# Patient Record
Sex: MALE | Race: WHITE | Employment: FULL TIME | URBAN - METROPOLITAN AREA
[De-identification: names, ages, dates, MRNs, and addresses within clinical notes are randomized per-mention and may not be internally consistent; named-entity substitution may affect disease eponyms.]

---

## 2023-02-16 ENCOUNTER — OFFICE VISIT (OUTPATIENT)
Dept: ENT CLINIC | Age: 38
End: 2023-02-16
Payer: COMMERCIAL

## 2023-02-16 VITALS — HEIGHT: 75 IN | WEIGHT: 215 LBS | BODY MASS INDEX: 26.73 KG/M2 | RESPIRATION RATE: 16 BRPM | TEMPERATURE: 97.6 F

## 2023-02-16 DIAGNOSIS — B97.89 SORE THROAT (VIRAL): ICD-10-CM

## 2023-02-16 DIAGNOSIS — R05.1 ACUTE COUGH: Primary | ICD-10-CM

## 2023-02-16 DIAGNOSIS — J02.8 SORE THROAT (VIRAL): ICD-10-CM

## 2023-02-16 PROCEDURE — 99203 OFFICE O/P NEW LOW 30 MIN: CPT | Performed by: OTOLARYNGOLOGY

## 2023-02-16 RX ORDER — CETIRIZINE HYDROCHLORIDE 10 MG/1
10 TABLET ORAL DAILY
COMMUNITY

## 2023-02-16 RX ORDER — BENZONATATE 100 MG/1
100-200 CAPSULE ORAL 3 TIMES DAILY PRN
Qty: 60 CAPSULE | Refills: 0 | Status: SHIPPED | OUTPATIENT
Start: 2023-02-16 | End: 2023-02-23

## 2023-02-16 RX ORDER — PREDNISONE 20 MG/1
20 TABLET ORAL 2 TIMES DAILY
Qty: 10 TABLET | Refills: 0 | Status: SHIPPED | OUTPATIENT
Start: 2023-02-16 | End: 2023-02-21

## 2023-02-16 ASSESSMENT — ENCOUNTER SYMPTOMS
CHOKING: 0
CHEST TIGHTNESS: 0
SHORTNESS OF BREATH: 0
TROUBLE SWALLOWING: 0
SORE THROAT: 1
COUGH: 1
WHEEZING: 0
SINUS PRESSURE: 0
SINUS PAIN: 0
VOICE CHANGE: 0
ABDOMINAL PAIN: 0

## 2023-02-16 NOTE — PATIENT INSTRUCTIONS
-Drink 1.5-2L of water daily  -Can start tessalon pearls for cough  -Start steroids for general inflammation/sore throat  -Increase flonase to twice daily  (with chin to chest positioning)    Call if no improvement in 1-2 weeks

## 2023-02-16 NOTE — PROGRESS NOTES
[address] [address], [address], [address] [place] [address]  P: [phone]       Patient     Jefm Cushing  1985    Chief Concern     Chief Complaint   Patient presents with    New Patient     States that he has been coughing a lot and has had a sore throat for the last few days, states that his daughter currently has strep. 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 States that he had sinus surgery 2 years ago. Assessment and Plan      Diagnosis Orders   1. Acute cough  benzonatate (TESSALON) 100 MG capsule      2. Sore throat (viral)  predniSONE (DELTASONE) 20 MG tablet        Patient with concern for acute cough, sore throat-likely viral (no pharyngitis palpated, no lymphadenopathy noted), will treat him with Tessalon for his cough, and prednisone for inflammation-he is currently on Flonase, we have asked him to increase his to twice daily dosing he is to call back if no improvement    No follow-ups on file. History of Present Illness     34 y.o. male with history of CRS, prior FESS per Dr. Iveth Davidson 2 years ago - following surgery he felt improved nasal breathing, continues to have sinus pressure and congestion, bilateral ear fullness - believes this is seasonal (worse in Winter/Spring transition, in the fall and with low pressure weather). Occasional clear rhinorrhea, no purulent drainage. Slight hypogeusia/hyposmia. No history of allergy testing or shots, not currently on any sinus rinses but uses flonase and cetirizine daily. Concern for post-nasal drip ongoing for several weeks, since 01/2023 - daughter has strep pharyngitis this week. Having sore throat that began this week - no fevers, chills, night sweat. No chest pain/dyspnea. Concern for vertigo 01/2023 - had severe nausea, intermittent vertigo, bad headache that lasted 10 minutes at a time, ongoing for 1.5 weeks.  Was treated with meclizine per PCP (Dr. Kavita Tejada at [facility]) - no history of prior vertigo, COVID/flu testing were negative, no hearing loss, no tinnitus. Coffee 1 cup/day, nonsmoker    Past Medical History     No past medical history on file. Past Surgical History     Past Surgical History:   Procedure Laterality Date    SINUS SURGERY         Family History     No family history on file. Social History     Social History     Tobacco Use    Smoking status: Never    Smokeless tobacco: Never   Vaping Use    Vaping Use: Never used   Substance Use Topics    Alcohol use: Not Currently     Comment: None in the last month    Drug use: Never        Allergies     Allergies   Allergen Reactions    Pcn [Penicillins] Other (See Comments)     Unknown was told as a child he was allergic       Medications     Current Outpatient Medications   Medication Sig Dispense Refill    Fluticasone Propionate (FLONASE ALLERGY RELIEF NA)       cetirizine (ZYRTEC) 10 MG tablet Take 10 mg by mouth daily      Ibuprofen (MOTRIN IB PO) Take by mouth      benzonatate (TESSALON) 100 MG capsule Take 1-2 capsules by mouth 3 times daily as needed for Cough 60 capsule 0    predniSONE (DELTASONE) 20 MG tablet Take 1 tablet by mouth 2 times daily for 5 days 10 tablet 0     No current facility-administered medications for this visit. Review of Systems     Review of Systems   Constitutional:  Negative for activity change, chills, diaphoresis, fatigue and fever. HENT:  Positive for sore throat. Negative for congestion, hearing loss, sinus pressure, sinus pain, trouble swallowing and voice change. Eyes:  Negative for visual disturbance. Respiratory:  Positive for cough. Negative for choking, chest tightness, shortness of breath and wheezing. Cardiovascular:  Negative for chest pain. Gastrointestinal:  Negative for abdominal pain. Musculoskeletal:  Negative for neck pain and neck stiffness. Skin:  Negative for rash. Neurological:  Negative for dizziness, light-headedness and headaches. Hematological:  Negative for adenopathy. PhysicalExam     Vitals:    02/16/23 0919   Resp: 16   Temp: 97.6 °F (36.4 °C)   TempSrc: Infrared   Weight: 215 lb (97.5 kg)   Height: 6' 3\" (1.905 m)       Physical Exam  Vitals reviewed. Constitutional:       Appearance: Normal appearance. HENT:      Head: Normocephalic and atraumatic. Right Ear: Tympanic membrane, ear canal and external ear normal. There is no impacted cerumen. Left Ear: Tympanic membrane, ear canal and external ear normal. There is no impacted cerumen. Nose: No congestion or rhinorrhea. Mouth/Throat:      Lips: Pink. No lesions. Mouth: Mucous membranes are moist. No oral lesions. Tongue: No lesions. Tongue does not deviate from midline. Palate: No mass and lesions. Pharynx: Oropharynx is clear. Uvula midline. No oropharyngeal exudate or posterior oropharyngeal erythema. Eyes:      Extraocular Movements: Extraocular movements intact. Pupils: Pupils are equal, round, and reactive to light. Musculoskeletal:      Cervical back: Normal range of motion and neck supple. Lymphadenopathy:      Cervical: No cervical adenopathy. Neurological:      Mental Status: He is alert. Cranial Nerves: No cranial nerve deficit. Data Review        Procedure     None    Meir Young MD  2/16/23    Portions of this note were dictated using Dragon.  There may be linguistic errors secondary to the use of this program.

## 2023-02-23 ENCOUNTER — OFFICE VISIT (OUTPATIENT)
Dept: ENT CLINIC | Age: 38
End: 2023-02-23
Payer: COMMERCIAL

## 2023-02-23 VITALS — HEIGHT: 75 IN | WEIGHT: 215 LBS | BODY MASS INDEX: 26.73 KG/M2 | TEMPERATURE: 96.8 F | RESPIRATION RATE: 16 BRPM

## 2023-02-23 DIAGNOSIS — R05.2 SUBACUTE COUGH: ICD-10-CM

## 2023-02-23 DIAGNOSIS — R09.82 POSTNASAL DRIP: Primary | ICD-10-CM

## 2023-02-23 PROCEDURE — 31575 DIAGNOSTIC LARYNGOSCOPY: CPT | Performed by: OTOLARYNGOLOGY

## 2023-02-23 PROCEDURE — 99213 OFFICE O/P EST LOW 20 MIN: CPT | Performed by: OTOLARYNGOLOGY

## 2023-02-23 RX ORDER — SODIUM CHLORIDE/SODIUM BICARB
1 PACKET (EA) NASAL 2 TIMES DAILY
Qty: 200 EACH | Refills: 1 | Status: SHIPPED | OUTPATIENT
Start: 2023-02-23

## 2023-02-23 RX ORDER — PREDNISONE 20 MG/1
40 TABLET ORAL DAILY
Qty: 20 TABLET | Refills: 0 | Status: SHIPPED | OUTPATIENT
Start: 2023-02-23 | End: 2023-03-05

## 2023-02-23 ASSESSMENT — ENCOUNTER SYMPTOMS
COUGH: 1
TROUBLE SWALLOWING: 0
SINUS PRESSURE: 0
CHOKING: 0
VOICE CHANGE: 0
CHEST TIGHTNESS: 0
WHEEZING: 0
SINUS PAIN: 0
SHORTNESS OF BREATH: 0
SORE THROAT: 1
ABDOMINAL PAIN: 0

## 2023-02-23 NOTE — PATIENT INSTRUCTIONS
Instructions for Sinus Rinses/Nasal Sprays  -Start hypertonic nasal saline rinses saline twice daily - use distilled water, or water that you have boiled (and that has cooled to room temperature). Use a salt packet in the sinus rinse bottle.  When performing rinses, please stand over your sink with your chin tucked into your chest. Use 4oz of the bottle in the left nasal passage, and 4oz in the right nasal passage  -Start topical nasal steroid sprays twice daily 15-20 minutes after sinus rinses - again, tuck chin to chest when using sprays and place the spray tip of the bottle in the nose, with the tip oriented toward the ceiling   -Start prednisone 40mg daily for 10 days

## 2023-02-23 NOTE — PROGRESS NOTES
Worse sinus drainage and postnasal drip over the past 7 days, along with productive cough that has slight yellow tinge, but typically white. No improvement with tessalon pearls. Had a fever yesterday to 99.9 yesterday after taking Tylenol. Has chills, no chest pain, but forceful cough which has worsened. Right ear with otalgia/fullness.

## 2023-02-23 NOTE — PROGRESS NOTES
[facility] [address], [address], [address] [place] [address]  P: [phone]       Patient     Bianka Danielle  1985    Chief Concern     Chief Complaint   Patient presents with    Follow-up     States that he is not doing any better       Assessment and Plan      Diagnosis Orders   1. Postnasal drip  Hypertonic Nasal Wash (SINUS RINSE REFILL) PACK      2. Subacute cough  predniSONE (DELTASONE) 20 MG tablet        Patient with concern for acute cough, sore throat-likely viral (no pharyngitis palpated, no lymphadenopathy noted), but may have a significant post-nasal drip. Will start him on sinus rinses, continue flonase, attempt a greater course of prednisone and consider CXR/chest CT if no improvement. Return in about 2 weeks (around 3/9/2023). History of Present Illness     34 y.o. male with history of CRS, prior FESS per Dr. Angela Zepeda 2 years ago - following surgery he felt improved nasal breathing, continues to have sinus pressure and congestion, bilateral ear fullness - believes this is seasonal (worse in Winter/Spring transition, in the fall and with low pressure weather). Occasional clear rhinorrhea, no purulent drainage. Slight hypogeusia/hyposmia. No history of allergy testing or shots, not currently on any sinus rinses but uses flonase and cetirizine daily. Concern for post-nasal drip ongoing for several weeks, since 01/2023 - daughter has strep pharyngitis this week. Having sore throat that began this week - no fevers, chills, night sweat. No chest pain/dyspnea. Concern for vertigo 01/2023 - had severe nausea, intermittent vertigo, bad headache that lasted 10 minutes at a time, ongoing for 1.5 weeks. Was treated with meclizine per PCP (Dr. Stephanie Pablo at [facility]) - no history of prior vertigo, COVID/flu testing were negative, no hearing loss, no tinnitus.      Coffee 1 cup/day, nonsmoker    Interval History 2/23/2023: Last visit 2/16/2023, no improvement with 20mg prednisone BID x 5 days, tessalon pearls - worse sinus drainage and postnasal drip over the past 7 days, along with productive cough that has slight yellow tinge, but typically white. Had a fever yesterday to 99.9 yesterday after taking Tylenol. Has chills, no chest pain, but forceful cough which has worsened. Right ear with otalgia/fullness.     Past Medical History     No past medical history on file.    Past Surgical History     Past Surgical History:   Procedure Laterality Date    SINUS SURGERY         Family History     No family history on file.    Social History     Social History     Tobacco Use    Smoking status: Never    Smokeless tobacco: Never   Vaping Use    Vaping Use: Never used   Substance Use Topics    Alcohol use: Not Currently     Comment: None in the last month    Drug use: Never        Allergies     Allergies   Allergen Reactions    Pcn [Penicillins] Other (See Comments)     Unknown was told as a child he was allergic       Medications     Current Outpatient Medications   Medication Sig Dispense Refill    predniSONE (DELTASONE) 20 MG tablet Take 2 tablets by mouth daily for 10 days 20 tablet 0    Hypertonic Nasal Wash (SINUS RINSE REFILL) PACK 1 packet by Nasal route 2 times daily 200 each 1    Fluticasone Propionate (FLONASE ALLERGY RELIEF NA)       cetirizine (ZYRTEC) 10 MG tablet Take 10 mg by mouth daily      Ibuprofen (MOTRIN IB PO) Take by mouth      benzonatate (TESSALON) 100 MG capsule Take 1-2 capsules by mouth 3 times daily as needed for Cough 60 capsule 0     No current facility-administered medications for this visit.       Review of Systems     Review of Systems   Constitutional:  Negative for activity change, chills, diaphoresis, fatigue and fever.   HENT:  Positive for sore throat. Negative for congestion, hearing loss, sinus pressure, sinus pain, trouble swallowing and voice change.    Eyes:  Negative for visual disturbance.   Respiratory:  Positive for cough. Negative for choking, chest tightness,  shortness of breath and wheezing. Cardiovascular:  Negative for chest pain. Gastrointestinal:  Negative for abdominal pain. Musculoskeletal:  Negative for neck pain and neck stiffness. Skin:  Negative for rash. Neurological:  Negative for dizziness, light-headedness and headaches. Hematological:  Negative for adenopathy. PhysicalExam     Vitals:    02/23/23 1028   Resp: 16   Temp: 96.8 °F (36 °C)   TempSrc: Infrared   Weight: 215 lb (97.5 kg)   Height: 6' 3\" (1.905 m)       Physical Exam  Vitals reviewed. Constitutional:       Appearance: Normal appearance. HENT:      Head: Normocephalic and atraumatic. Right Ear: Tympanic membrane, ear canal and external ear normal. There is no impacted cerumen. Left Ear: Tympanic membrane, ear canal and external ear normal. There is no impacted cerumen. Nose: No congestion or rhinorrhea. Mouth/Throat:      Lips: Pink. No lesions. Mouth: Mucous membranes are moist. No oral lesions. Tongue: No lesions. Tongue does not deviate from midline. Palate: No mass and lesions. Pharynx: Oropharynx is clear. Uvula midline. No oropharyngeal exudate or posterior oropharyngeal erythema. Eyes:      Extraocular Movements: Extraocular movements intact. Pupils: Pupils are equal, round, and reactive to light. Musculoskeletal:      Cervical back: Normal range of motion and neck supple. Lymphadenopathy:      Cervical: No cervical adenopathy. Neurological:      Mental Status: He is alert. Cranial Nerves: No cranial nerve deficit. Data Review        Procedure     Flexible Laryngoscopy    Pre op: Chronic cough. Post op: Postnasal drip  Procedure : Flexible Nasopharyngolaryngoscopy  Surgeon: KENZIE Hatfield  Anesthesia: Afrin with 2% lidocaine  Estimated Blood Loss: None    Procedure:   Flexible Laryngoscopy     After obtaining consent, the patient was placed in the examination chair in the upright position.   Decongestant and topical anesthetic was sprayed in the After allowing adequate time for hemostatic effect, the flexible 4 mm laryngoscope was passed via the bilateral nasal passages    Nasal Septum: No acute septal deviation, no septal hematoma or perforations noted - significant nasal edema and thick post-nasal drip on the right side, no purulent drainage noted  Nasal Findings: No active hemorrhage, no nasal masses appreciated   Nasopharynx: Clear, no masses or inflammation  Oropharynx: Bilateral tonsillar tissue present, no exophytic masses  Base of Tongue: Lingual tonsils within normal limits, vallecula without effacement  Epiglottis: Upright, in normal anatomical position  True Vocal Cord: Anatomically normal (slight edema), no masses or inflammation, normal abduction and adduction - subglottis without purulent drainage  False Vocal Cord: normal   Hypopharynx Mucosa: No masses or inflammation of the piriform sinuses or postcricoid area  Arytenoids: Normal mucosa, no dislocation appreciated - slight intra-arytenoid pachydermia     * Patient tolerated the procedure well with no complications   * Patient was instructed not to eat for 30 minutes following procedure. * Patient was instructed that they may notice minor bleeding. Attestation:   I was present for the entire viewing, including introduction and removal of the scope. Gio Carbone MD  2/23/23    Portions of this note were dictated using Dragon.  There may be linguistic errors secondary to the use of this program.

## 2023-03-02 ENCOUNTER — TELEPHONE (OUTPATIENT)
Dept: ENT CLINIC | Age: 38
End: 2023-03-02

## 2023-03-02 DIAGNOSIS — J18.9 ATYPICAL PNEUMONIA: ICD-10-CM

## 2023-03-02 DIAGNOSIS — R09.82 POSTNASAL DRIP: Primary | ICD-10-CM

## 2023-03-03 RX ORDER — AZITHROMYCIN 500 MG/1
500 TABLET, FILM COATED ORAL DAILY
Qty: 3 TABLET | Refills: 0 | Status: SHIPPED | OUTPATIENT
Start: 2023-03-03 | End: 2023-03-06

## 2023-03-03 RX ORDER — AZELASTINE 1 MG/ML
1 SPRAY, METERED NASAL 2 TIMES DAILY
Qty: 60 ML | Refills: 1 | Status: SHIPPED | OUTPATIENT
Start: 2023-03-03

## 2023-03-03 NOTE — TELEPHONE ENCOUNTER
Discussed with patient - will add azelastine, short course of azithromycin.
Patient calling requesting medication for ongoing symptoms. Wanting either    predniSONE (DELTASONE) 20 MG tablet     Steroid again Or antidotic called into        [facility]/pharmacy #[id number]- [place] [id number]     Patient made an appointment for next week for follow up but is almost out of medication. Sates that he is worried to go without some kind of medication. He wanted to be seen on Friday.      Please advise [phone]
none

## 2023-03-08 ENCOUNTER — OFFICE VISIT (OUTPATIENT)
Dept: ENT CLINIC | Age: 38
End: 2023-03-08
Payer: COMMERCIAL

## 2023-03-08 VITALS — HEIGHT: 75 IN | WEIGHT: 215 LBS | TEMPERATURE: 97.4 F | RESPIRATION RATE: 16 BRPM | BODY MASS INDEX: 26.73 KG/M2

## 2023-03-08 DIAGNOSIS — R09.82 POSTNASAL DRIP: Primary | ICD-10-CM

## 2023-03-08 PROCEDURE — 95028 IQ TSTS ALLERGY DELAYED RXN: CPT | Performed by: OTOLARYNGOLOGY

## 2023-03-08 PROCEDURE — 99213 OFFICE O/P EST LOW 20 MIN: CPT | Performed by: OTOLARYNGOLOGY

## 2023-03-08 PROCEDURE — 95024 IQ TESTS W/ALLERGENIC XTRCS: CPT | Performed by: OTOLARYNGOLOGY

## 2023-03-08 ASSESSMENT — ENCOUNTER SYMPTOMS
SINUS PAIN: 0
ABDOMINAL PAIN: 0
TROUBLE SWALLOWING: 0
SHORTNESS OF BREATH: 0
SORE THROAT: 1
CHOKING: 0
CHEST TIGHTNESS: 0
COUGH: 1
VOICE CHANGE: 0
SINUS PRESSURE: 0
WHEEZING: 0

## 2023-03-08 NOTE — PROGRESS NOTES
Joaquin Rod[address], [address], [address] [place] [address]  P: [phone]       Patient     Sammie Singh  1985    Chief Concern     Chief Complaint   Patient presents with    Follow-up     States that he is feeling much  better, can breathe again       Assessment and Plan      Diagnosis Orders   1. Postnasal drip  MI IC TSTS W/ALLGIC XTRCS DLYD TYP RXN W/READING    MI INTRACUTANEOUS TESTS W/ALLERGENIC EXTRACTS        Patient with concern for acute cough, sore throat-likely viral (no pharyngitis palpated, no lymphadenopathy noted), but may have a significant post-nasal drip. This has improved after a course of azithromycin, steroids-we will have him continue sinus rinses, Flonase, azelastine, slowly taper down and resume if no improvement. Return if symptoms worsen or fail to improve. History of Present Illness     34 y.o. male with history of CRS, prior FESS per Dr. Everlina Dandy 2 years ago - following surgery he felt improved nasal breathing, continues to have sinus pressure and congestion, bilateral ear fullness - believes this is seasonal (worse in Winter/Spring transition, in the fall and with low pressure weather). Occasional clear rhinorrhea, no purulent drainage. Slight hypogeusia/hyposmia. No history of allergy testing or shots, not currently on any sinus rinses but uses flonase and cetirizine daily. Concern for post-nasal drip ongoing for several weeks, since 01/2023 - daughter has strep pharyngitis this week. Having sore throat that began this week - no fevers, chills, night sweat. No chest pain/dyspnea. Concern for vertigo 01/2023 - had severe nausea, intermittent vertigo, bad headache that lasted 10 minutes at a time, ongoing for 1.5 weeks. Was treated with meclizine per PCP (Dr. Jose Luis Burnett at [facility]) - no history of prior vertigo, COVID/flu testing were negative, no hearing loss, no tinnitus.      Coffee 1 cup/day, nonsmoker    Interval History 2/23/2023: Last visit 2/16/2023, no improvement with 20mg prednisone BID x 5 days, tessalon pearls - worse sinus drainage and postnasal drip over the past 7 days, along with productive cough that has slight yellow tinge, but typically white. Had a fever yesterday to 99.9 yesterday after taking Tylenol. Has chills, no chest pain, but forceful cough which has worsened. Right ear with otalgia/fullness. Interval History 3/8/2023: Completed course of azithromycin, continuing sinus rinses, flonase and azelastine. Postnasal drip improved, significant improvement in breathing. Past Medical History     No past medical history on file. Past Surgical History     Past Surgical History:   Procedure Laterality Date    SINUS SURGERY         Family History     No family history on file. Social History     Social History     Tobacco Use    Smoking status: Never    Smokeless tobacco: Never   Vaping Use    Vaping Use: Never used   Substance Use Topics    Alcohol use: Not Currently     Comment: None in the last month    Drug use: Never        Allergies     Allergies   Allergen Reactions    Pcn [Penicillins] Other (See Comments)     Unknown was told as a child he was allergic       Medications     Current Outpatient Medications   Medication Sig Dispense Refill    azelastine (ASTELIN) 0.1 % nasal spray 1 spray by Nasal route 2 times daily Use in each nostril as directed 60 mL 1    Hypertonic Nasal Wash (SINUS RINSE REFILL) PACK 1 packet by Nasal route 2 times daily 200 each 1    Fluticasone Propionate (FLONASE ALLERGY RELIEF NA)       cetirizine (ZYRTEC) 10 MG tablet Take 10 mg by mouth daily      Ibuprofen (MOTRIN IB PO) Take by mouth       No current facility-administered medications for this visit. Review of Systems     Review of Systems   Constitutional:  Negative for activity change, chills, diaphoresis, fatigue and fever. HENT:  Positive for sore throat.  Negative for congestion, hearing loss, sinus pressure, sinus pain, trouble swallowing and voice change. Eyes:  Negative for visual disturbance. Respiratory:  Positive for cough. Negative for choking, chest tightness, shortness of breath and wheezing. Cardiovascular:  Negative for chest pain. Gastrointestinal:  Negative for abdominal pain. Musculoskeletal:  Negative for neck pain and neck stiffness. Skin:  Negative for rash. Neurological:  Negative for dizziness, light-headedness and headaches. Hematological:  Negative for adenopathy. PhysicalExam     Vitals:    03/08/23 1318   Resp: 16   Temp: 97.4 °F (36.3 °C)   TempSrc: Infrared   Weight: 215 lb (97.5 kg)   Height: 6' 3\" (1.905 m)       Physical Exam  Vitals reviewed. Constitutional:       Appearance: Normal appearance. HENT:      Head: Normocephalic and atraumatic. Right Ear: Tympanic membrane, ear canal and external ear normal. There is no impacted cerumen. Left Ear: Tympanic membrane, ear canal and external ear normal. There is no impacted cerumen. Nose: No congestion or rhinorrhea. Mouth/Throat:      Lips: Pink. No lesions. Mouth: Mucous membranes are moist. No oral lesions. Tongue: No lesions. Tongue does not deviate from midline. Palate: No mass and lesions. Pharynx: Oropharynx is clear. Uvula midline. No oropharyngeal exudate or posterior oropharyngeal erythema. Eyes:      Extraocular Movements: Extraocular movements intact. Pupils: Pupils are equal, round, and reactive to light. Musculoskeletal:      Cervical back: Normal range of motion and neck supple. Lymphadenopathy:      Cervical: No cervical adenopathy. Neurological:      Mental Status: He is alert. Cranial Nerves: No cranial nerve deficit. Data Review        Procedure     Shaun Lindo MD  3/8/23    Portions of this note were dictated using Dragon.  There may be linguistic errors secondary to the use of this program.

## 2023-03-08 NOTE — PATIENT INSTRUCTIONS
-Follow up for allergy testing if desired  -Continue sinus rinses, azelastine, flonase - may decrease to once daily after 2 weeks, then stop after another 2 weeks - if symptoms recur, restart sinus rinses and topical therapy

## 2024-01-13 SDOH — HEALTH STABILITY: PHYSICAL HEALTH: ON AVERAGE, HOW MANY MINUTES DO YOU ENGAGE IN EXERCISE AT THIS LEVEL?: 30 MIN

## 2024-01-13 SDOH — HEALTH STABILITY: PHYSICAL HEALTH: ON AVERAGE, HOW MANY DAYS PER WEEK DO YOU ENGAGE IN MODERATE TO STRENUOUS EXERCISE (LIKE A BRISK WALK)?: 3 DAYS

## 2024-01-25 ENCOUNTER — OFFICE VISIT (OUTPATIENT)
Dept: FAMILY MEDICINE CLINIC | Age: 39
End: 2024-01-25
Payer: COMMERCIAL

## 2024-01-25 VITALS
WEIGHT: 222 LBS | DIASTOLIC BLOOD PRESSURE: 74 MMHG | HEIGHT: 75 IN | SYSTOLIC BLOOD PRESSURE: 122 MMHG | OXYGEN SATURATION: 98 % | BODY MASS INDEX: 27.6 KG/M2 | HEART RATE: 72 BPM

## 2024-01-25 DIAGNOSIS — L74.510 AXILLARY HYPERHIDROSIS: ICD-10-CM

## 2024-01-25 DIAGNOSIS — Z00.00 WELL ADULT EXAM: Primary | ICD-10-CM

## 2024-01-25 PROCEDURE — 99386 PREV VISIT NEW AGE 40-64: CPT | Performed by: STUDENT IN AN ORGANIZED HEALTH CARE EDUCATION/TRAINING PROGRAM

## 2024-01-25 ASSESSMENT — PATIENT HEALTH QUESTIONNAIRE - PHQ9
SUM OF ALL RESPONSES TO PHQ QUESTIONS 1-9: 0
SUM OF ALL RESPONSES TO PHQ9 QUESTIONS 1 & 2: 0
2. FEELING DOWN, DEPRESSED OR HOPELESS: 0
1. LITTLE INTEREST OR PLEASURE IN DOING THINGS: 0

## 2024-01-25 NOTE — PROGRESS NOTES
Smokeless tobacco: Never   Vaping Use    Vaping Use: Never used   Substance and Sexual Activity    Alcohol use: Yes     Alcohol/week: 4.0 standard drinks of alcohol     Types: 4 Glasses of wine per week     Comment: None in the last month    Drug use: Never    Sexual activity: Yes     Partners: Female   Other Topics Concern    Not on file   Social History Narrative    Not on file     Social Determinants of Health     Financial Resource Strain: Not on file   Food Insecurity: Not on file   Transportation Needs: Not on file   Physical Activity: Insufficiently Active (1/13/2024)    Exercise Vital Sign     Days of Exercise per Week: 3 days     Minutes of Exercise per Session: 30 min   Stress: Not on file   Social Connections: Not on file   Intimate Partner Violence: Not on file   Housing Stability: Not on file        History reviewed. No pertinent family history.    PE  Vitals:    01/25/24 1557   BP: 122/74   Pulse: 72   SpO2: 98%   Weight: 100.7 kg (222 lb)   Height: 1.905 m (6' 3\")     Estimated body mass index is 27.75 kg/m² as calculated from the following:    Height as of this encounter: 1.905 m (6' 3\").    Weight as of this encounter: 100.7 kg (222 lb).    Physical Exam  Vitals reviewed.   Constitutional:       General: He is not in acute distress.     Appearance: Normal appearance. He is not ill-appearing, toxic-appearing or diaphoretic.   HENT:      Head: Normocephalic and atraumatic.      Right Ear: Tympanic membrane, ear canal and external ear normal.      Left Ear: Tympanic membrane, ear canal and external ear normal.      Mouth/Throat:      Mouth: Mucous membranes are moist.      Pharynx: Oropharynx is clear. No oropharyngeal exudate or posterior oropharyngeal erythema.   Eyes:      General: No scleral icterus.     Extraocular Movements: Extraocular movements intact.      Conjunctiva/sclera: Conjunctivae normal.      Pupils: Pupils are equal, round, and reactive to light.   Cardiovascular:      Rate and